# Patient Record
Sex: MALE | Race: BLACK OR AFRICAN AMERICAN | NOT HISPANIC OR LATINO | URBAN - METROPOLITAN AREA
[De-identification: names, ages, dates, MRNs, and addresses within clinical notes are randomized per-mention and may not be internally consistent; named-entity substitution may affect disease eponyms.]

---

## 2018-06-24 ENCOUNTER — EMERGENCY (EMERGENCY)
Facility: HOSPITAL | Age: 28
LOS: 1 days | Discharge: HOME | End: 2018-06-24
Admitting: EMERGENCY MEDICINE
Payer: MEDICAID

## 2018-06-24 VITALS
RESPIRATION RATE: 18 BRPM | TEMPERATURE: 98 F | OXYGEN SATURATION: 100 % | SYSTOLIC BLOOD PRESSURE: 146 MMHG | HEART RATE: 81 BPM | DIASTOLIC BLOOD PRESSURE: 79 MMHG

## 2018-06-24 DIAGNOSIS — Z79.899 OTHER LONG TERM (CURRENT) DRUG THERAPY: ICD-10-CM

## 2018-06-24 DIAGNOSIS — R05 COUGH: ICD-10-CM

## 2018-06-24 PROCEDURE — 99283 EMERGENCY DEPT VISIT LOW MDM: CPT

## 2018-06-24 RX ORDER — PSEUDOEPHEDRINE HCL 30 MG
1 TABLET ORAL
Qty: 5 | Refills: 0 | OUTPATIENT
Start: 2018-06-24 | End: 2018-06-28

## 2018-06-24 RX ORDER — PSEUDOEPHEDRINE HCL 30 MG
30 TABLET ORAL ONCE
Qty: 0 | Refills: 0 | Status: COMPLETED | OUTPATIENT
Start: 2018-06-24 | End: 2018-06-24

## 2018-06-24 RX ADMIN — Medication 30 MILLIGRAM(S): at 20:38

## 2018-06-24 RX ADMIN — Medication 200 MILLIGRAM(S): at 20:38

## 2018-06-24 NOTE — ED PROVIDER NOTE - PHYSICAL EXAMINATION
VITAL SIGNS: I have reviewed nursing notes and confirm.  CONSTITUTIONAL: Well-developed; well-nourished; in no acute distress.  SKIN: Skin is warm and dry, no acute rash.  HEAD: Normocephalic; atraumatic.  EYES: PERRL, EOM intact; conjunctiva and sclera clear.  ENT: No nasal discharge; airway clear.  NECK: Supple; non tender.  CARD: S1, S2 normal; no murmurs, gallops, or rubs. Regular rate and rhythm.  RESP: No wheezes, rales or rhonchi.  ABD: Normal bowel sounds; soft; non-distended; non-tender;  no palpable or pulsating mass; no hepatosplenomegaly.  EXT: Normal ROM. No clubbing, cyanosis or edema.  NEURO: Alert, oriented. Grossly unremarkable.  PSYCH: Cooperative, appropriate.

## 2018-06-24 NOTE — ED PROVIDER NOTE - OBJECTIVE STATEMENT
28 year old male with no PMHx presents with cough x 12 hours. also requesting something to eat 28 year old male with no PMHx presents with cough x 12 hours. also requesting something to eat. reports [facility] and did not have time to eat. no other medical complaint. Denies sore throat, cough, SOB, CP, palpitations, wheezing, abdominal pain, N/V/D/C, change in urinary/bowel function, dysuria, hematuria, flank pain, malaise, rash, HA, and dizziness.  No recent travel or sick contact noted.

## 2020-02-25 NOTE — ED ADULT NURSE NOTE - IS THE PATIENT ABLE TO BE SCREENED?
"PRIMARY DIAGNOSIS: ACUTE LOW BACK PAIN  OUTPATIENT/OBSERVATION GOALS TO BE MET BEFORE DISCHARGE:  1. Pain Status: Improved-controlled with oral pain medications.    2. Return to near baseline physical activity: No    3. Cleared for discharge by consultants (if involved): N/A    Discharge Planner Nurse   Safe discharge environment identified: Yes  Barriers to discharge: Yes       Entered by: Caro Camacho 02/25/2020     Please review provider order for any additional goals. Nurse to notify provider when observation goals have been met and patient is ready for discharge.    /75 (BP Location: Left arm)   Pulse 73   Temp 95.4  F (35.2  C) (Oral)   Resp 20   Ht 1.88 m (6' 2\")   Wt 112.9 kg (249 lb)   SpO2 98%   BMI 31.97 kg/m      Pt A&Ox4. Pt reports back pain is \"doing much better\". Pt has not gotten up out of bed with SBA gait belt and walker. PIV saline locked. Reg diet. Plan- discharge home w/ outpatient rehab. Appointment scheduled for later today at [facility] for back therapy. Patient was able to ambulate around the long [place] hallway in Observation. Will continue to monitor and provide cares.   " Yes

## 2022-07-25 ENCOUNTER — EMERGENCY (EMERGENCY)
Facility: HOSPITAL | Age: 32
LOS: 1 days | Discharge: HOME | End: 2022-07-25
Admitting: EMERGENCY MEDICINE

## 2022-07-25 VITALS
WEIGHT: 179.9 LBS | RESPIRATION RATE: 18 BRPM | HEART RATE: 95 BPM | TEMPERATURE: 99 F | HEIGHT: 72 IN | SYSTOLIC BLOOD PRESSURE: 142 MMHG | DIASTOLIC BLOOD PRESSURE: 91 MMHG | OXYGEN SATURATION: 98 %

## 2022-07-25 LAB — HIV 1 & 2 AB SERPL IA.RAPID: REACTIVE

## 2022-07-25 PROCEDURE — 99284 EMERGENCY DEPT VISIT MOD MDM: CPT

## 2022-07-25 PROCEDURE — 99053 MED SERV 10PM-8AM 24 HR FAC: CPT

## 2022-07-25 RX ORDER — CEFTRIAXONE 500 MG/1
500 INJECTION, POWDER, FOR SOLUTION INTRAMUSCULAR; INTRAVENOUS ONCE
Refills: 0 | Status: COMPLETED | OUTPATIENT
Start: 2022-07-25 | End: 2022-07-25

## 2022-07-25 RX ORDER — PENICILLIN G BENZATHINE 1200000 [IU]/2ML
2.4 INJECTION, SUSPENSION INTRAMUSCULAR ONCE
Refills: 0 | Status: COMPLETED | OUTPATIENT
Start: 2022-07-25 | End: 2022-07-25

## 2022-07-25 RX ORDER — BACITRACIN ZINC 500 UNIT/G
1 OINTMENT IN PACKET (EA) TOPICAL ONCE
Refills: 0 | Status: COMPLETED | OUTPATIENT
Start: 2022-07-25 | End: 2022-07-25

## 2022-07-25 RX ADMIN — Medication 1 APPLICATION(S): at 06:23

## 2022-07-25 RX ADMIN — PENICILLIN G BENZATHINE 2.4 MILLION UNIT(S): 1200000 INJECTION, SUSPENSION INTRAMUSCULAR at 06:15

## 2022-07-25 RX ADMIN — CEFTRIAXONE 500 MILLIGRAM(S): 500 INJECTION, POWDER, FOR SOLUTION INTRAMUSCULAR; INTRAVENOUS at 06:15

## 2022-07-25 RX ADMIN — Medication 100 MILLIGRAM(S): at 06:15

## 2022-07-25 NOTE — ED PROVIDER NOTE - PATIENT PORTAL LINK FT
You can access the FollowMyHealth Patient Portal offered by [facility] by registering at the following website: [email or web address]. By joining [facility]’s FollowMyHealth portal, you will also be able to view your health information using other applications (apps) compatible with our system.

## 2022-07-25 NOTE — ED PROVIDER NOTE - NSFOLLOWUPINSTRUCTIONS_ED_ALL_ED_FT
A sexually transmitted disease (STD) is a disease or infection that may be passed (transmitted) from person to person, usually during sexual activity. This may happen by way of saliva, semen, blood, vaginal mucus, or urine. Symptoms vary depending on the type of STD acquired and may include pain in the groin, discharge, and lesions or a rash. If you are started on an antibiotic, take it exactly as instructed. Avoid sexual contact of any kind until cleared by a health care professional. Contact your sexual partner(s) to inform them of your diagnosis so that they may be tested and treated as well.    SEEK IMMEDIATE MEDICAL CARE IF YOU HAVE ANY OF THE FOLLOWING SYMPTOMS: severe abdominal pain, high fever, nausea/vomiting, or unintended weight loss.         Initial 24 hours:   -  Keep wound clean and dry for 24 hours.  -  Minimal bleeding may occur.  If so, apply constant pressure for 5 minutes.  If bleeding continues, return to the emergency department.     After initial 24 hours:  -  Remove dressing, cleanse with soap and water, apply antibiotic ointment and cover with a clean dressing.  This should be done daily  -  Maintain a clean and dry dressing.  If the dressing gets wet or soiled, replace the dressing as needed.  -  Return for signs of infection:  redness, swelling, colored drainage, increased pain, fever.      AVOID SEXUAL CONTACT AND MASTURBATION UNTIL WOUND HAS HEALED COMPLETELY.  TAKE ANTIBIOTICS AS PRESCRIBED UNTIL COMPLETE.

## 2022-07-25 NOTE — ED PROVIDER NOTE - OBJECTIVE STATEMENT
33 y/o M, MSM, PMH of HIV on FLOOD (undetectable) presents to ED c/o green penile discharge and dysuria.  He also has a penile wound and penile swelling after wearing a cock ring 3 nights ago. He admits to recreational drug use at the time.  He has approx 4-5 sexual partners at present.  He admits to both, insertive and receptive intercourse.  He denies rectal pain.  Pt denies fever/chills, sore throat, oral pain, rash or lesions.

## 2022-07-25 NOTE — ED PROVIDER NOTE - PHYSICAL EXAMINATION
Constitutional:  Well appearing, awake, alert, oriented to person, place, time/situation and in no apparent distress  Head:  NC/AT, symmetric  ENMT: Airway patent  Eyes:  Clear bilaterally, pupils equal, round   Cardiac:  Normal rate  Respiratory:  Normal respiratory rate and effort  GI:   Abd soft, non-distended  :  Roger, RN chaperone.  uncircumcised, + diffuse penile shaft edema, no erythema, no ttp. no lesions or vesicles.  there is an approx 2 cm linear horizontal wound at the crown of the penis adjacent to the frenulum, there is a 4 cm linear wound at the base of the penile shaft.  no drainage, no erythema.  MSK:  Atraumatic, FROM  Neuro:  Alert and oriented  Skin:  Skin normal color for race, warm, dry and intact.    Psych:  Normal mood and affect, no apparent risk to self or others.

## 2022-07-25 NOTE — ED PROVIDER NOTE - CARE PLAN
Principal Discharge DX:	Visit for screening for infections w/predomly sexual mode transmission  Secondary Diagnosis:	Open wound of penis   1

## 2022-07-25 NOTE — ED PROVIDER NOTE - CLINICAL SUMMARY MEDICAL DECISION MAKING FREE TEXT BOX
33 y/o M presents to ED c/o penile discharge and penile wound from a cock ring.  Pt well appearing, VSS, NAD.  Pt treated empirically for g/c and syphilis.  Wound care advised: cleanse with soap and water, antibiotic ointment.  Pt advised to avoid any sexual contact and to avoid masturbation.    Results and diagnosis discussed with patient.  Treatment plan discussed, abx and wound care.  Return precautions discussed.  Pt verbalized understanding and given the opportunity to ask questions.  Patient advised to follow up with primary care provider.

## 2022-07-26 DIAGNOSIS — Z21 ASYMPTOMATIC HUMAN IMMUNODEFICIENCY VIRUS [HIV] INFECTION STATUS: ICD-10-CM

## 2022-07-26 DIAGNOSIS — R36.9 URETHRAL DISCHARGE, UNSPECIFIED: ICD-10-CM

## 2022-07-26 DIAGNOSIS — Z11.3 ENCOUNTER FOR SCREENING FOR INFECTIONS WITH A PREDOMINANTLY SEXUAL MODE OF TRANSMISSION: ICD-10-CM

## 2022-07-26 DIAGNOSIS — S31.20XA UNSPECIFIED OPEN WOUND OF PENIS, INITIAL ENCOUNTER: ICD-10-CM
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
## 2022-07-26 LAB
C TRACH RRNA SPEC QL NAA+PROBE: SIGNIFICANT CHANGE UP
HIV 1+2 AB+HIV1 P24 AG SERPL QL IA: REACTIVE
HIV1+2 AB SPEC QL: ABNORMAL
HIV1+2 AB SPEC QL: SIGNIFICANT CHANGE UP
N GONORRHOEA RRNA SPEC QL NAA+PROBE: SIGNIFICANT CHANGE UP
RPR SER-TITR: SIGNIFICANT CHANGE UP
RPR SERPL-ACNC: SIGNIFICANT CHANGE UP
SPECIMEN SOURCE: SIGNIFICANT CHANGE UP
SPECIMEN SOURCE: SIGNIFICANT CHANGE UP
T PALLIDUM AB TITR SER: POSITIVE
T PALLIDUM IGG SER QL IF: REACTIVE
